# Patient Record
Sex: FEMALE | Race: WHITE | NOT HISPANIC OR LATINO | Employment: UNEMPLOYED | URBAN - NONMETROPOLITAN AREA
[De-identification: names, ages, dates, MRNs, and addresses within clinical notes are randomized per-mention and may not be internally consistent; named-entity substitution may affect disease eponyms.]

---

## 2022-04-29 ENCOUNTER — OFFICE VISIT (OUTPATIENT)
Dept: ENDOCRINOLOGY | Facility: CLINIC | Age: 62
End: 2022-04-29

## 2022-04-29 VITALS
HEIGHT: 66 IN | HEART RATE: 70 BPM | DIASTOLIC BLOOD PRESSURE: 62 MMHG | BODY MASS INDEX: 23.9 KG/M2 | OXYGEN SATURATION: 99 % | SYSTOLIC BLOOD PRESSURE: 112 MMHG | WEIGHT: 148.7 LBS

## 2022-04-29 DIAGNOSIS — E06.3 HYPOTHYROIDISM DUE TO HASHIMOTO'S THYROIDITIS: Primary | ICD-10-CM

## 2022-04-29 DIAGNOSIS — R55 POSTURAL DIZZINESS WITH PRESYNCOPE: ICD-10-CM

## 2022-04-29 DIAGNOSIS — E03.8 HYPOTHYROIDISM DUE TO HASHIMOTO'S THYROIDITIS: Primary | ICD-10-CM

## 2022-04-29 DIAGNOSIS — R42 POSTURAL DIZZINESS WITH PRESYNCOPE: ICD-10-CM

## 2022-04-29 PROBLEM — K80.20 GALLSTONE: Status: ACTIVE | Noted: 2022-04-29

## 2022-04-29 PROBLEM — I49.9 IRREGULAR HEART BEAT: Status: ACTIVE | Noted: 2021-12-15

## 2022-04-29 PROBLEM — E11.9 TYPE 2 DIABETES MELLITUS: Status: ACTIVE | Noted: 2022-04-29

## 2022-04-29 PROBLEM — H35.30 MACULAR DEGENERATION: Status: ACTIVE | Noted: 2022-04-29

## 2022-04-29 PROBLEM — K59.09 CHRONIC CONSTIPATION: Status: ACTIVE | Noted: 2021-12-15

## 2022-04-29 PROBLEM — I10 ESSENTIAL HYPERTENSION: Status: ACTIVE | Noted: 2022-02-01

## 2022-04-29 PROBLEM — K76.0 STEATOSIS OF LIVER: Status: ACTIVE | Noted: 2022-04-29

## 2022-04-29 PROBLEM — J45.909 ASTHMA: Status: ACTIVE | Noted: 2022-04-29

## 2022-04-29 PROBLEM — Z72.3 LACK OF EXERCISE: Status: ACTIVE | Noted: 2022-02-01

## 2022-04-29 PROBLEM — Z86.16 HISTORY OF SEVERE ACUTE RESPIRATORY SYNDROME CORONAVIRUS 2 (SARS-COV-2) DISEASE: Status: ACTIVE | Noted: 2021-12-15

## 2022-04-29 PROBLEM — D03.9 MELANOMA IN SITU: Status: ACTIVE | Noted: 2022-04-29

## 2022-04-29 PROCEDURE — 99204 OFFICE O/P NEW MOD 45 MIN: CPT | Performed by: INTERNAL MEDICINE

## 2022-04-29 RX ORDER — PROGESTERONE 200 MG/1
200 CAPSULE ORAL EVERY EVENING
COMMUNITY
Start: 2022-04-06

## 2022-04-29 RX ORDER — LEVOTHYROXINE SODIUM 137 UG/1
137 CAPSULE ORAL DAILY
Qty: 30 CAPSULE | Refills: 11 | Status: SHIPPED | OUTPATIENT
Start: 2022-04-29

## 2022-04-29 RX ORDER — SIMVASTATIN 20 MG
20 TABLET ORAL
COMMUNITY
Start: 2021-07-16 | End: 2022-04-29

## 2022-04-29 RX ORDER — LISINOPRIL 5 MG/1
5 TABLET ORAL DAILY
COMMUNITY
Start: 2022-04-20 | End: 2022-04-29

## 2022-04-29 RX ORDER — BUDESONIDE 0.5 MG/2ML
INHALANT ORAL
COMMUNITY
Start: 2021-12-15

## 2022-04-29 RX ORDER — FENOFIBRATE 54 MG/1
TABLET ORAL
COMMUNITY
End: 2022-04-29

## 2022-04-29 RX ORDER — UREA 10 %
1 LOTION (ML) TOPICAL
COMMUNITY

## 2022-04-29 RX ORDER — LEVOTHYROXINE SODIUM 0.15 MG/1
TABLET ORAL
COMMUNITY
Start: 2021-12-15 | End: 2022-04-29

## 2022-04-29 RX ORDER — METOPROLOL SUCCINATE 25 MG/1
25 TABLET, EXTENDED RELEASE ORAL DAILY
COMMUNITY
Start: 2022-04-20

## 2022-04-29 RX ORDER — CIPROFLOXACIN 500 MG/1
TABLET, FILM COATED ORAL
COMMUNITY

## 2022-04-29 NOTE — PROGRESS NOTES
"Chief Complaint   Patient presents with   • Establish Care     New pt    • Hyperthyroidism         History of Present Illness    61 y.o. female   w Hypothyroidism sec to Hashimoto's for 30 years    Pt is well groomed, very pleasant, educated and very attentive to her health.     She is on levothyroxine 150 mcgs daily and has had doses between 137 to 175 over the past months and questions what is the right dose for her.     She has fatigue, cold , dry skin and questions if dose of levothyroxine should be guided by symptoms or labs or both.     Presently on levothyroxine 150 mcgs daily and TSH of 0.4 , previous month TSH of 0.3 with elevated Free T4 and elevated reverse T3  Positive TPO ab and Tg ab     Nl CBC , CMP , Vitamin levels    LDL cholesterol 200   Tg 80      ==========================================  Physical Exam  /62   Pulse 70   Ht 166.4 cm (65.5\")   Wt 67.4 kg (148 lb 11.2 oz)   SpO2 99%   BMI 24.37 kg/m²   AOx3  No Goiter , no carotid bruit  RRR  CTA  No Edema     ==========================================    Laboratory Workup    Lab Results   Component Value Date    WBC 7.2 08/12/2019    HGB 13.7 08/12/2019    HCT 41.7 08/12/2019    MCV 85.5 08/12/2019     08/12/2019       Lab Results   Component Value Date    BUN 14 12/15/2021    CREATININE 0.7 12/15/2021    EGFRIFAFRI 103 07/15/2021     (L) 12/15/2021    K 4.1 12/15/2021    CL 93 (L) 12/15/2021    CO2 25 12/15/2021    CALCIUM 9.5 12/15/2021    PHOS 3.7 11/09/2020    ALBUMIN 4.3 12/15/2021    BILITOT 0.89 12/15/2021    ALKPHOS 82 12/15/2021    AST 25 12/15/2021    ALT 19 12/15/2021     Thyroid Workup    Lab Results   Component Value Date    TSH 5.41 07/15/2021    TSH 1.24 11/05/2020    TSH 0.31 (L) 02/18/2020       Lab Results   Component Value Date    FREET4 1.05 07/15/2021    FREET4 1.14 (H) 11/05/2020    FREET4 2.08 (H) 02/18/2020               ==========================================      ICD-10-CM ICD-9-CM   1. " Hypothyroidism due to Hashimoto's thyroiditis  E03.8 244.8    E06.3 245.2   -    On Levothyroxine 150 mcgs daily and TSH 0.3 to 0.4 w elevated Free T4 and elevated reverse T3    Change to brand name tirosint 137 mcgs daily     We will guide our dosing based on TSH , w target in mid normal range     Labs in 6 weeks and I will call     -- elevated LDL most likely a reflection of her keto diet and high fat diet.     No orders of the defined types were placed in this encounter.               This document has been electronically signed by Franki Lilly MD on April 29, 2022 09:55 CDT